# Patient Record
Sex: MALE
[De-identification: names, ages, dates, MRNs, and addresses within clinical notes are randomized per-mention and may not be internally consistent; named-entity substitution may affect disease eponyms.]

---

## 2018-06-15 ENCOUNTER — APPOINTMENT (OUTPATIENT)
Dept: UROLOGY | Facility: CLINIC | Age: 63
End: 2018-06-15

## 2018-07-31 ENCOUNTER — APPOINTMENT (OUTPATIENT)
Dept: UROLOGY | Facility: CLINIC | Age: 63
End: 2018-07-31
Payer: MEDICARE

## 2018-07-31 VITALS
HEART RATE: 90 BPM | TEMPERATURE: 98.5 F | RESPIRATION RATE: 17 BRPM | WEIGHT: 150 LBS | DIASTOLIC BLOOD PRESSURE: 83 MMHG | SYSTOLIC BLOOD PRESSURE: 176 MMHG | HEIGHT: 60 IN | BODY MASS INDEX: 29.45 KG/M2

## 2018-07-31 DIAGNOSIS — N19 UNSPECIFIED KIDNEY FAILURE: ICD-10-CM

## 2018-07-31 DIAGNOSIS — N13.8 BENIGN PROSTATIC HYPERPLASIA WITH LOWER URINARY TRACT SYMPMS: ICD-10-CM

## 2018-07-31 DIAGNOSIS — I10 ESSENTIAL (PRIMARY) HYPERTENSION: ICD-10-CM

## 2018-07-31 DIAGNOSIS — H26.9 UNSPECIFIED CATARACT: ICD-10-CM

## 2018-07-31 DIAGNOSIS — N40.1 BENIGN PROSTATIC HYPERPLASIA WITH LOWER URINARY TRACT SYMPMS: ICD-10-CM

## 2018-07-31 DIAGNOSIS — E11.9 TYPE 2 DIABETES MELLITUS W/OUT COMPLICATIONS: ICD-10-CM

## 2018-07-31 DIAGNOSIS — H40.9 UNSPECIFIED GLAUCOMA: ICD-10-CM

## 2018-07-31 PROCEDURE — 99204 OFFICE O/P NEW MOD 45 MIN: CPT

## 2018-07-31 RX ORDER — PAPAVERINE HYDROCHLORIDE 30 MG/ML
30 INJECTION, SOLUTION INTRAVENOUS
Qty: 2.5 | Refills: 11 | Status: ACTIVE | COMMUNITY
Start: 2018-07-31 | End: 1900-01-01

## 2018-08-02 RX ORDER — CYCLOSPORINE 25 MG/1
CAPSULE, GELATIN COATED ORAL
Refills: 0 | Status: ACTIVE | COMMUNITY

## 2018-08-02 RX ORDER — MYCOPHENOLATE MOFETIL 500 MG/1
TABLET ORAL
Refills: 0 | Status: ACTIVE | COMMUNITY

## 2018-08-02 RX ORDER — SITAGLIPTIN 100 MG/1
TABLET, FILM COATED ORAL
Refills: 0 | Status: ACTIVE | COMMUNITY

## 2018-08-02 RX ORDER — TAMSULOSIN HYDROCHLORIDE 0.4 MG/1
CAPSULE ORAL
Refills: 0 | Status: ACTIVE | COMMUNITY

## 2018-08-02 RX ORDER — PREDNISONE 10 MG
TABLET ORAL
Refills: 0 | Status: ACTIVE | COMMUNITY

## 2018-08-02 RX ORDER — HYDRALAZINE HYDROCHLORIDE 50 MG/1
TABLET ORAL
Refills: 0 | Status: ACTIVE | COMMUNITY

## 2018-08-02 RX ORDER — LABETALOL HYDROCHLORIDE 300 MG/1
TABLET, FILM COATED ORAL
Refills: 0 | Status: ACTIVE | COMMUNITY

## 2018-08-10 ENCOUNTER — APPOINTMENT (OUTPATIENT)
Dept: UROLOGY | Facility: CLINIC | Age: 63
End: 2018-08-10
Payer: MEDICARE

## 2018-08-10 VITALS
DIASTOLIC BLOOD PRESSURE: 68 MMHG | BODY MASS INDEX: 29.45 KG/M2 | HEART RATE: 89 BPM | RESPIRATION RATE: 16 BRPM | TEMPERATURE: 98 F | SYSTOLIC BLOOD PRESSURE: 167 MMHG | WEIGHT: 150 LBS | HEIGHT: 60 IN

## 2018-08-10 DIAGNOSIS — Z84.1 FAMILY HISTORY OF DISORDERS OF KIDNEY AND URETER: ICD-10-CM

## 2018-08-10 DIAGNOSIS — N52.9 MALE ERECTILE DYSFUNCTION, UNSPECIFIED: ICD-10-CM

## 2018-08-10 DIAGNOSIS — Z80.6 FAMILY HISTORY OF LEUKEMIA: ICD-10-CM

## 2018-08-10 DIAGNOSIS — Z83.3 FAMILY HISTORY OF DIABETES MELLITUS: ICD-10-CM

## 2018-08-10 PROCEDURE — 54235 NJX CORPORA CAVERNOSA RX AGT: CPT

## 2018-08-10 PROCEDURE — 99213 OFFICE O/P EST LOW 20 MIN: CPT | Mod: 25

## 2019-09-16 ENCOUNTER — APPOINTMENT (OUTPATIENT)
Dept: SURGERY | Facility: CLINIC | Age: 64
End: 2019-09-16
Payer: COMMERCIAL

## 2019-09-16 VITALS
DIASTOLIC BLOOD PRESSURE: 68 MMHG | HEART RATE: 87 BPM | HEIGHT: 60 IN | BODY MASS INDEX: 29.88 KG/M2 | SYSTOLIC BLOOD PRESSURE: 147 MMHG | WEIGHT: 152.19 LBS | OXYGEN SATURATION: 96 % | TEMPERATURE: 98.2 F

## 2019-09-16 DIAGNOSIS — K43.2 INCISIONAL HERNIA W/OUT OBSTRUCTION OR GANGRENE: ICD-10-CM

## 2019-09-16 PROCEDURE — 99203 OFFICE O/P NEW LOW 30 MIN: CPT

## 2019-09-16 NOTE — HISTORY OF PRESENT ILLNESS
[de-identified] : Patient is a 63 year old male with history of kidney transplant performed in [place] in 2007 for ESRD, also with history of laparotomy and left inguinal hernia repair for incarcerated left inguinal hernia, who presents with an incisional hernia. Patient states that over the past year, he has noted the bulging has worsened, and causes discomfort. He denies obstipation, denies N/V, no fever/chills, is tolerating diet well and having normal bowel function. He also has a history of HTN, DM, and ED. 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

## 2019-09-16 NOTE — ASSESSMENT
[FreeTextEntry1] : 63M with history of renal transplant for ESRD and exploratory laparotomy with left inguinal hernia repair for strangulated left inguinal hernia, now with incisional hernia at midline laparotomy site, on exam consistent with swiss-cheese defect

## 2019-09-16 NOTE — PLAN
[FreeTextEntry1] : \par - Will obtain CT abdomen/pelvis to delineate anatomy of the incisional hernia for assessment and operative planning\par - Pt to follow with Dr. Thomas once imaging obtained\par - Pt advised to return to the ED should he experience incarceration of the incisional hernia\par - Pt was agreeable to this plan and all questions were answered

## 2019-09-16 NOTE — REVIEW OF SYSTEMS
[Negative] : Neurological [FreeTextEntry7] : Large incisional hernia at midline laparotomy [FreeTextEntry8] : Renal transplant

## 2019-09-16 NOTE — PHYSICAL EXAM
[Respiratory Effort] : normal respiratory effort [Normal Rate and Rhythm] : normal rate and rhythm [Alert] : alert [Oriented to Person] : oriented to person [Oriented to Place] : oriented to place [Oriented to Time] : oriented to time [Calm] : calm [de-identified] : NAD [de-identified] : Large midline incision with approximately 6 cm swiss-cheese defect at epigastrium, reducible, non-TTP in all quadrants, right hockey-stick incision, no rebound/guarding